# Patient Record
(demographics unavailable — no encounter records)

---

## 2025-06-11 NOTE — ASSESSMENT
[FreeTextEntry1] : PT, meds  Will discuss MRI f/u 6 weeks   NSAIDs- Patient warned of risk of medication to GI tract, increased blood pressure, cardiac risk, and risk of fluid retention.  Advised to clear medication with internist or PCP if any concurrent health problem with heart, blood pressure, or GI system exists.

## 2025-06-11 NOTE — HISTORY OF PRESENT ILLNESS
[Neck] : neck [Upper back] : upper back [4] : 4 [1] : 2 [Tingling] : tingling [Frequent] : frequent [Rest] : rest [Part time] : Work status: part time [de-identified] : 6/11/25: HAMZAH STARKS is a 66-year y/o M here for an evaluation of His neck, reports that pain has been ongoing for about 6 months  Denies specific injury. Reports N/T in RT fingers. Denies prior HX in neck.  Not taking anything for the pain.  Was given an anti-inflammatory by his PCP with only some relief.   Occupation:   [] : no

## 2025-06-11 NOTE — IMAGING
[de-identified] : CSPINE Inspection: No rash or ecchymosis Palpation: spasm and TTP in traps, rhomboids, paracervicals ROM: Limited all planes Strength: 5/5 bilateral deltoid, biceps, triceps, wrist flexors, wrist extensors, , abductors Sensation: Sensation present to light touch bilateral C5-T1 distributions Reflexes: Negative Baker's bilaterally  [Straightening consistent with spasm] : Straightening consistent with spasm [Disc space narrowing] : Disc space narrowing

## 2025-07-09 NOTE — IMAGING
[Straightening consistent with spasm] : Straightening consistent with spasm [Disc space narrowing] : Disc space narrowing [de-identified] : CSPINE Inspection: No rash or ecchymosis Palpation: spasm and TTP in traps, rhomboids, paracervicals ROM: Limited all planes Strength: 5/5 bilateral deltoid, biceps, triceps, wrist flexors, wrist extensors, , abductors Sensation: Sensation present to light touch bilateral C5-T1 distributions Reflexes: Negative Baker's bilaterally

## 2025-07-09 NOTE — HISTORY OF PRESENT ILLNESS
[Neck] : neck [Upper back] : upper back [4] : 4 [1] : 2 [Tingling] : tingling [Frequent] : frequent [Rest] : rest [Part time] : Work status: part time [de-identified] : 6/11/25: HAMZAH STARKS is a 66-year y/o M here for an evaluation of His neck, reports that pain has been ongoing for about 6 months  Denies specific injury. Reports N/T in RT fingers. Denies prior HX in neck.  Not taking anything for the pain.  Was given an anti-inflammatory by his PCP with only some relief.  7/9/25- sxs same.  Occupation:   [] : no

## 2025-07-09 NOTE — ASSESSMENT
[FreeTextEntry1] : c/w meloxicam MRI C spine Follow up after MRI    NSAIDs- Patient warned of risk of medication to GI tract, increased blood pressure, cardiac risk, and risk of fluid retention.  Advised to clear medication with internist or PCP if any concurrent health problem with heart, blood pressure, or GI system exists.